# Patient Record
Sex: FEMALE | Race: WHITE | NOT HISPANIC OR LATINO | URBAN - METROPOLITAN AREA
[De-identification: names, ages, dates, MRNs, and addresses within clinical notes are randomized per-mention and may not be internally consistent; named-entity substitution may affect disease eponyms.]

---
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
## 2019-10-09 NOTE — ASU PREOP CHECKLIST - SELECT TESTS ORDERED
Instructions: This plan will send the code FBSE to the PM system.  DO NOT or CHANGE the price. Price (Do Not Change): 0.00 Detail Level: Generalized UCG/today

## 2021-08-10 ENCOUNTER — APPOINTMENT (OUTPATIENT)
Dept: FAMILY MEDICINE | Facility: CLINIC | Age: 21
End: 2021-08-10
Payer: COMMERCIAL

## 2021-08-10 VITALS
OXYGEN SATURATION: 98 % | HEIGHT: 64 IN | TEMPERATURE: 96.6 F | WEIGHT: 110 LBS | BODY MASS INDEX: 18.78 KG/M2 | SYSTOLIC BLOOD PRESSURE: 112 MMHG | DIASTOLIC BLOOD PRESSURE: 80 MMHG | HEART RATE: 83 BPM

## 2021-08-10 VITALS
DIASTOLIC BLOOD PRESSURE: 80 MMHG | OXYGEN SATURATION: 98 % | TEMPERATURE: 96.6 F | WEIGHT: 110 LBS | HEIGHT: 64 IN | BODY MASS INDEX: 18.78 KG/M2 | HEART RATE: 83 BPM | SYSTOLIC BLOOD PRESSURE: 112 MMHG

## 2021-08-10 DIAGNOSIS — H92.09 OTALGIA, UNSPECIFIED EAR: ICD-10-CM

## 2021-08-10 DIAGNOSIS — Z78.9 OTHER SPECIFIED HEALTH STATUS: ICD-10-CM

## 2021-08-10 PROBLEM — F41.9 ANXIETY DISORDER, UNSPECIFIED: Chronic | Status: ACTIVE | Noted: 2017-06-26

## 2021-08-10 PROBLEM — J35.8 OTHER CHRONIC DISEASES OF TONSILS AND ADENOIDS: Chronic | Status: ACTIVE | Noted: 2017-06-27

## 2021-08-10 PROBLEM — F90.9 ATTENTION-DEFICIT HYPERACTIVITY DISORDER, UNSPECIFIED TYPE: Chronic | Status: ACTIVE | Noted: 2017-06-26

## 2021-08-10 PROCEDURE — 99213 OFFICE O/P EST LOW 20 MIN: CPT

## 2021-08-10 RX ORDER — METHYLPHENIDATE HYDROCHLORIDE 36 MG/1
36 TABLET, EXTENDED RELEASE ORAL DAILY
Refills: 0 | Status: ACTIVE | COMMUNITY
Start: 2021-08-10

## 2021-08-10 NOTE — PHYSICAL EXAM
[Normal] : affect was normal and insight and judgment were intact [de-identified] : EAC clear b/l- TM effusion b/l without any TM bulging.  nares edematous b/l, PND

## 2021-08-10 NOTE — PLAN
[FreeTextEntry1] : Patient is advised to use the Flonase as directed, and to avoid allergens if possible.  She  is also advised to use an oral antihistamine if the Flonase alone is not effective.  She is also advised to wash her hair at night, to keep bedroom windows closed, to keep pets off of upholstery and to use HEPA filters in common areas and in the bedroom. She may also use hypoallergenic pillowcases as well, as this may also help mitigate some of her symptoms.  If there is no improvement with the above, she is advised to follow up with the office for further med changes, and if needed, referral to ENT or an allergist.  She will also use the acetic acid as needed for her ear.  SHe has no sign of infection, and no antibiotics are currently recommended. \par

## 2021-08-10 NOTE — HISTORY OF PRESENT ILLNESS
[FreeTextEntry8] : Patient complains of right ear pain x 3 days.  She denies any congestion or URI symptoms.  SHe has not been swimming, but admits that she put warm water in her ear trying to address it.

## 2021-09-01 ENCOUNTER — RX CHANGE (OUTPATIENT)
Age: 21
End: 2021-09-01

## 2021-12-22 ENCOUNTER — APPOINTMENT (OUTPATIENT)
Dept: FAMILY MEDICINE | Facility: CLINIC | Age: 21
End: 2021-12-22
Payer: COMMERCIAL

## 2021-12-22 VITALS
TEMPERATURE: 98.7 F | OXYGEN SATURATION: 99 % | HEIGHT: 64 IN | DIASTOLIC BLOOD PRESSURE: 60 MMHG | WEIGHT: 107 LBS | SYSTOLIC BLOOD PRESSURE: 108 MMHG | HEART RATE: 73 BPM | BODY MASS INDEX: 18.27 KG/M2

## 2021-12-22 DIAGNOSIS — Z83.3 FAMILY HISTORY OF DIABETES MELLITUS: ICD-10-CM

## 2021-12-22 PROCEDURE — G0444 DEPRESSION SCREEN ANNUAL: CPT | Mod: 59

## 2021-12-22 PROCEDURE — 99395 PREV VISIT EST AGE 18-39: CPT

## 2021-12-22 RX ORDER — FLUOXETINE HYDROCHLORIDE 20 MG/1
20 TABLET ORAL
Refills: 0 | Status: DISCONTINUED | COMMUNITY
Start: 2021-08-10 | End: 2021-12-22

## 2021-12-22 RX ORDER — NORETHINDRONE ACETATE AND ETHINYL ESTRADIOL, ETHINYL ESTRADIOL AND FERROUS FUMARATE 1MG-10(24)
KIT ORAL
Refills: 0 | Status: ACTIVE | COMMUNITY

## 2021-12-22 NOTE — PLAN
[FreeTextEntry1] : Reviewed age-appropriate preventive screening tests with patient. UTD on gyn exam. Offered/revd STI labs and she defers on these today as not needed. \par \par Labs 2020 showed mildly low HDL 42 and mildly elevated  and we revd  eating and she has already made some healthy changes. Disc option to check labs again this year vs. ok to defer for a year or two if she prefers. She opts to defer. She will cont to work on eating cleanly. \par \par She had flu vacc this season and has appt today for COVID vacc booster dose \par \par Discussed clean eating (eg Mediterranean style eating plan) and regular exercise/staying as physically active as possible.\par \par Cont ADD meds and f/u as per psychiatrist. \par \par Reviewed importance of good self care (eg meditation, yoga, adequate rest, regular exercise, magnesium, clean eating etc).\par \par Next CPE in 1-4 yrs.

## 2021-12-22 NOTE — REVIEW OF SYSTEMS
[Anxiety] : anxiety [Negative] : Heme/Lymph [Headache] : no headache [Dizziness] : no dizziness [Fainting] : no fainting [Suicidal] : not suicidal [Insomnia] : no insomnia [Depression] : no depression [de-identified] : +ADD, sxs well controlled on current med regimen [de-identified] : anxiety sxs well controlled with good self care

## 2021-12-22 NOTE — HISTORY OF PRESENT ILLNESS
[de-identified] : Her last PE was last year \par \par Her last tetanus shot was 8/2020 Tdap\par Believes she had HPV series and will check with mom.\par \par Her last dentist visit was within past 6 months\par Her last eye doctor appointment was within past year or two\par \par Her diet is clean/healthful overall when home from college-- a little less so when at college but easier to eat healthfully this year as she has a kitchen so is doing better than last year \par Exercises regularly-- walks daily \par \par Her last gyn exam was in Summer 2021 Dr. Romanacci; is on OCPs. Not SA now but has been in past. Does not need STI labs at this time\par \par Cristel also has h/o ADD and anxiety. She is followed by a psychiatrist Dr. Pratt for these meds. She is on Concerta but no longer on fluoxetine (off this for year). Is tolerating med well and is effective for her. Does good self care regularly. \par \par Is entering final semester at [employer] major with focus on ME and will be looking for job afterwards

## 2021-12-22 NOTE — PHYSICAL EXAM
[No Acute Distress] : no acute distress [Well Nourished] : well nourished [Well Developed] : well developed [Well-Appearing] : well-appearing [Normal Sclera/Conjunctiva] : normal sclera/conjunctiva [EOMI] : extraocular movements intact [Normal Outer Ear/Nose] : the outer ears and nose were normal in appearance [No JVD] : no jugular venous distention [No Lymphadenopathy] : no lymphadenopathy [Thyroid Normal, No Nodules] : the thyroid was normal and there were no nodules present [Supple] : supple [No Respiratory Distress] : no respiratory distress  [No Accessory Muscle Use] : no accessory muscle use [Clear to Auscultation] : lungs were clear to auscultation bilaterally [Normal Rate] : normal rate  [Regular Rhythm] : with a regular rhythm [Normal S1, S2] : normal S1 and S2 [No Murmur] : no murmur heard [No Carotid Bruits] : no carotid bruits [No Varicosities] : no varicosities [Pedal Pulses Present] : the pedal pulses are present [No Edema] : there was no peripheral edema [No Extremity Clubbing/Cyanosis] : no extremity clubbing/cyanosis [Soft] : abdomen soft [Non Tender] : non-tender [Non-distended] : non-distended [No Masses] : no abdominal mass palpated [No HSM] : no HSM [Normal Bowel Sounds] : normal bowel sounds [Normal Posterior Cervical Nodes] : no posterior cervical lymphadenopathy [Normal Anterior Cervical Nodes] : no anterior cervical lymphadenopathy [No Joint Swelling] : no joint swelling [Grossly Normal Strength/Tone] : grossly normal strength/tone [No Rash] : no rash [Coordination Grossly Intact] : coordination grossly intact [No Focal Deficits] : no focal deficits [Normal Gait] : normal gait [Normal Affect] : the affect was normal [Normal Insight/Judgement] : insight and judgment were intact [de-identified] : slender frame

## 2021-12-22 NOTE — HEALTH RISK ASSESSMENT
[0] : 2) Feeling down, depressed, or hopeless: Not at all (0) [PHQ-2 Negative - No further assessment needed] : PHQ-2 Negative - No further assessment needed [[id number]] : 0

## 2022-12-28 ENCOUNTER — APPOINTMENT (OUTPATIENT)
Dept: FAMILY MEDICINE | Facility: CLINIC | Age: 22
End: 2022-12-28

## 2022-12-28 VITALS
HEIGHT: 64 IN | OXYGEN SATURATION: 98 % | TEMPERATURE: 98.7 F | DIASTOLIC BLOOD PRESSURE: 62 MMHG | SYSTOLIC BLOOD PRESSURE: 102 MMHG | HEART RATE: 107 BPM | BODY MASS INDEX: 17.75 KG/M2 | WEIGHT: 104 LBS

## 2022-12-28 DIAGNOSIS — J01.00 ACUTE MAXILLARY SINUSITIS, UNSPECIFIED: ICD-10-CM

## 2022-12-28 PROCEDURE — 99395 PREV VISIT EST AGE 18-39: CPT

## 2022-12-28 RX ORDER — FLUTICASONE PROPIONATE 50 UG/1
50 SPRAY, METERED NASAL
Qty: 16 | Refills: 3 | Status: DISCONTINUED | COMMUNITY
Start: 2021-08-10 | End: 2022-12-28

## 2022-12-28 RX ORDER — ACETIC ACID 20 MG/ML
2 SOLUTION AURICULAR (OTIC) DAILY
Qty: 1 | Refills: 0 | Status: DISCONTINUED | COMMUNITY
Start: 2021-08-10 | End: 2022-12-28

## 2022-12-28 NOTE — PHYSICAL EXAM
[No Acute Distress] : no acute distress [Well Nourished] : well nourished [Well Developed] : well developed [Well-Appearing] : well-appearing [Normal Sclera/Conjunctiva] : normal sclera/conjunctiva [EOMI] : extraocular movements intact [Normal Outer Ear/Nose] : the outer ears and nose were normal in appearance [No JVD] : no jugular venous distention [No Lymphadenopathy] : no lymphadenopathy [Supple] : supple [Thyroid Normal, No Nodules] : the thyroid was normal and there were no nodules present [No Respiratory Distress] : no respiratory distress  [No Accessory Muscle Use] : no accessory muscle use [Clear to Auscultation] : lungs were clear to auscultation bilaterally [Normal Rate] : normal rate  [Regular Rhythm] : with a regular rhythm [Normal S1, S2] : normal S1 and S2 [No Murmur] : no murmur heard [No Carotid Bruits] : no carotid bruits [No Varicosities] : no varicosities [Pedal Pulses Present] : the pedal pulses are present [No Edema] : there was no peripheral edema [No Extremity Clubbing/Cyanosis] : no extremity clubbing/cyanosis [Soft] : abdomen soft [Non Tender] : non-tender [Non-distended] : non-distended [No Masses] : no abdominal mass palpated [No HSM] : no HSM [Normal Bowel Sounds] : normal bowel sounds [Normal Posterior Cervical Nodes] : no posterior cervical lymphadenopathy [Normal Anterior Cervical Nodes] : no anterior cervical lymphadenopathy [No Joint Swelling] : no joint swelling [Grossly Normal Strength/Tone] : grossly normal strength/tone [No Rash] : no rash [Coordination Grossly Intact] : coordination grossly intact [No Focal Deficits] : no focal deficits [Normal Gait] : normal gait [Normal Affect] : the affect was normal [Normal Insight/Judgement] : insight and judgment were intact [Normal Oropharynx] : the oropharynx was normal [Normal TMs] : both tympanic membranes were normal [de-identified] : slender frame, +audible nasal congestion, occas cough, no resp distress, nontoxic appearance [de-identified] : +TTP over B frontal/maxill sinuses; nasal mucosa erythematous and edematous [de-identified] : lungs CTA B with regular breathing and with cough, no w/c/r, good AE

## 2022-12-28 NOTE — REVIEW OF SYSTEMS
[Anxiety] : anxiety [Negative] : Heme/Lymph [Fever] : no fever [Chills] : no chills [Fatigue] : fatigue [Recent Change In Weight] : ~T no recent weight change [Earache] : earache [Nasal Discharge] : nasal discharge [Sore Throat] : no sore throat [Postnasal Drip] : postnasal drip [Headache] : no headache [Dizziness] : no dizziness [Fainting] : no fainting [Suicidal] : not suicidal [Insomnia] : no insomnia [Depression] : no depression [FreeTextEntry2] : +fatigue with acute illness sxs [FreeTextEntry4] : +sinus pressure/pain [de-identified] : +ADD, sxs well controlled on current med regimen [de-identified] : anxiety sxs well controlled with good self care

## 2022-12-28 NOTE — PLAN
[FreeTextEntry1] : Continue all medications as prescribed. Check labs as above; STI labs offered with labwork today and she defers on these as states is not needed at this time; she defers on labs today due to acutr resp infection but will RTO in near future for fasting labs. Will adjust any medications based upon lab results.\par \par Reviewed age-appropriate preventive screening tests with patient. She is UTD on gyn exam. \par \par Immunizations are UTD per Cristel, including she believes she had HPV series. I asked her to try to get HPV vacc dates for me to review/confirm she is UTD. \par \par Discussed clean eating (eg Mediterranean style eating plan) and regular exercise/staying as physically active as possible.  Include balance exercises and strength training and core strengthening exercises for bone health and to decrease risk for falls. \par \par Reviewed importance of good self care (e.g. meditation, yoga, adequate rest, regular exercise, magnesium, clean eating, etc.). \par \par Cont ADD meds and f/u as per psychiatrist.\par \par Reviewed likely diagnosis of viral URI/early sinus infection and revd usual course for sxs, supportive care measures including fluids, rest, vit C, black elderberry, umcka, zinc, OTC antihistamines (as needed to dry up post nasal drip/discharge etc but avoid these if sxs are localized to sinuses as can make it harder for mucous to drain from sinuses), OTC decongestants if tolerated (to help with congestion/sinus/facial pressure), Tylenol/ibuprofen prn pain, Mucinex (to thin mucous), nasal saline flushes/spray, nasal steroid spray (eg Fluticasone) +/- nasal decongestant spray (eg Afrin) if desired but if using this must limit use to <=3-5 days to avoid rebound nasal/sinus congestion/rhinitis medicamentosa etc. \par \par Reviewed indications for antibiotic use for sinus infections incl symptoms x >7-10 days without significant improvement, severe facial pain, persistent fevers, double worsening pattern of sxs etc. If needed can fill and take antibiotics and if needs to start these must complete entire course and should consider probiotic use while on antibiotics. She has used Doxycycline in the past (for acne) with no signif SE so I sent Rx for  this for her to fill and take over upcoming holiday weekend if incr/new sxs or if sxs not improving with incr supp care measures in next several days\par \par RTO if incr/new sxs or if sxs not improving with above measures over the next week.\par \par Follow up for next physical in 1-4 years.\par \par The following OTC medications are recommended to treat URI/cold symptoms. Patient may use any or all of the following, as clinically indicated by symptoms, as long as not contraindicated by allergy or other medical conditions.\par \par Neti-pot/nasal saline spray- ¼ tsp salt dissolved in warm water in Neti Pot\par Umcka (pelargonium)- treats cough/cold symptoms\par Sambucol/Sambucus (black elderberry syrup)- boosts immune system\par Nasal Steroid Spray (e.g. Fluticasone)- decreases nasal/sinus congestion; okay to use for weeks or months at a time\par Nasal decongestant spray (e.g. Afrin)- decreases nasal/sinus congestion but can only use short term (ie a few days at most) or else have side effects\par Decongestant (e.g. Sudafed)- decreases nasal/sinus congestion\par Antihistamine (e.g. Benadryl, Claritin etc.)- decreases runny nose and post nasal drip- NOT for use during acute sinus infections\par Mucolytic (e.g. Mucinex)- thins mucous to help it drain more easily\par Tylenol- for fever, pain, headache, aches etc.\par Ibuprofen (e.g. Advil, Motrin) or Naproxen (e.g. Aleve)- for fever, pain, headache, aches\par Fluids- thins mucous, keeps you hydrated\par Zinc- reduces duration and severity of cold symptoms\par Honey- 1 tsp. to 1 Tbsp. straight off spoon or mixed with tea or hot water- soothes sore throat and quiets cough\par \par

## 2022-12-28 NOTE — HISTORY OF PRESENT ILLNESS
[de-identified] : Her last physical exam was last year\par \par Vaccines:\par Tetanus is up to date; last Tdap 8/17/2020\par COVID vaccine is up to date\par Believes she had HPV series and will try to get dates\par \par Her last dentist visit was less than one year ago\par Her last eye doctor appointment was less than one year ago\par Her last dermatologist visit was within the past two years or three\par \par GYN visit is up to date; 12/2022, is on OCPs. Not SA now but has been in past. Does not need STI labs at this time.\par \par Her diet is healthy overall \par Exercise: regularly - walks daily\par \par Cristel also has h/o ADD and anxiety. She is followed by a psychiatrist Dr. Pratt for these meds. She is on Concerta 72 mg daily and if skips meds gets withdrawal SEs but can take 36 mg lower dose on some days. Is tolerating med well and is effective for her. Does good self care regularly. \par \par Has sinus infection sxs for past 5 days. Lost sense of taste and smell. +ear congestion and pressure, Sinus pressure/pain. +maxillary tooth pain, +nasal discharge. no fevers but she tends to not get these. Did two at home COVID tests and both were negative. Using OTC meds for sxs. Wants advice on what she can do for her sxs

## 2022-12-28 NOTE — ASSESSMENT
[FreeTextEntry1] : DONAVON PHILLIPS is a 22 year old female here for a physical exam.  She is also here to follow up on medical issues as noted above.\par \par Patient has a history of anxiety, attention deficit disorder, and hypercholesterolemia. She also has acute URI/viral head cold/sinus sxs (D#5). Revd that we can do viral swab if she would like to vs as she is healthy and it would not change mgmt in any way whether she has COVID or flu or other viral infection I am comfortable deferring on viral swab and just managing sxs expectantly. She defers on viral swab test at this time What Is The Reason For Today's Visit?: History of Melanoma Breslow Depth?: 0.20 Year Excised?: 2019

## 2022-12-28 NOTE — HEALTH RISK ASSESSMENT
[0] : 2) Feeling down, depressed, or hopeless: Not at all (0) [PHQ-2 Negative - No further assessment needed] : PHQ-2 Negative - No further assessment needed [[id number]] : 0

## 2023-01-11 ENCOUNTER — APPOINTMENT (OUTPATIENT)
Dept: FAMILY MEDICINE | Facility: CLINIC | Age: 23
End: 2023-01-11
Payer: COMMERCIAL

## 2023-01-11 PROCEDURE — 36415 COLL VENOUS BLD VENIPUNCTURE: CPT

## 2023-01-14 LAB
ALBUMIN SERPL ELPH-MCNC: 4.6 G/DL
ALP BLD-CCNC: 78 U/L
ALT SERPL-CCNC: 12 U/L
ANION GAP SERPL CALC-SCNC: 11 MMOL/L
AST SERPL-CCNC: 13 U/L
BASOPHILS # BLD AUTO: 0.04 K/UL
BASOPHILS NFR BLD AUTO: 0.6 %
BILIRUB SERPL-MCNC: 0.5 MG/DL
BUN SERPL-MCNC: 11 MG/DL
CALCIUM SERPL-MCNC: 10.2 MG/DL
CHLORIDE SERPL-SCNC: 103 MMOL/L
CHOLEST SERPL-MCNC: 161 MG/DL
CO2 SERPL-SCNC: 24 MMOL/L
CREAT SERPL-MCNC: 0.7 MG/DL
EGFR: 124 ML/MIN/1.73M2
EOSINOPHIL # BLD AUTO: 0.04 K/UL
EOSINOPHIL NFR BLD AUTO: 0.6 %
GLUCOSE SERPL-MCNC: 88 MG/DL
HCT VFR BLD CALC: 43.2 %
HDLC SERPL-MCNC: 40 MG/DL
HGB BLD-MCNC: 14 G/DL
IMM GRANULOCYTES NFR BLD AUTO: 0.1 %
LDLC SERPL CALC-MCNC: 86 MG/DL
LYMPHOCYTES # BLD AUTO: 2.01 K/UL
LYMPHOCYTES NFR BLD AUTO: 29.8 %
MAN DIFF?: NORMAL
MCHC RBC-ENTMCNC: 30.8 PG
MCHC RBC-ENTMCNC: 32.4 GM/DL
MCV RBC AUTO: 94.9 FL
MONOCYTES # BLD AUTO: 0.5 K/UL
MONOCYTES NFR BLD AUTO: 7.4 %
NEUTROPHILS # BLD AUTO: 4.15 K/UL
NEUTROPHILS NFR BLD AUTO: 61.5 %
NONHDLC SERPL-MCNC: 121 MG/DL
PLATELET # BLD AUTO: 320 K/UL
POTASSIUM SERPL-SCNC: 4.8 MMOL/L
PROT SERPL-MCNC: 7 G/DL
RBC # BLD: 4.55 M/UL
RBC # FLD: 12.8 %
SODIUM SERPL-SCNC: 139 MMOL/L
TRIGL SERPL-MCNC: 176 MG/DL
TSH SERPL-ACNC: 2.84 UIU/ML
WBC # FLD AUTO: 6.75 K/UL

## 2023-09-24 ENCOUNTER — NON-APPOINTMENT (OUTPATIENT)
Age: 23
End: 2023-09-24

## 2023-09-26 ENCOUNTER — APPOINTMENT (OUTPATIENT)
Dept: FAMILY MEDICINE | Facility: CLINIC | Age: 23
End: 2023-09-26
Payer: COMMERCIAL

## 2023-09-26 ENCOUNTER — RESULT CHARGE (OUTPATIENT)
Age: 23
End: 2023-09-26

## 2023-09-26 VITALS
WEIGHT: 104 LBS | TEMPERATURE: 99.4 F | HEART RATE: 85 BPM | BODY MASS INDEX: 17.75 KG/M2 | SYSTOLIC BLOOD PRESSURE: 92 MMHG | DIASTOLIC BLOOD PRESSURE: 62 MMHG | HEIGHT: 64 IN | OXYGEN SATURATION: 98 %

## 2023-09-26 DIAGNOSIS — J06.9 ACUTE UPPER RESPIRATORY INFECTION, UNSPECIFIED: ICD-10-CM

## 2023-09-26 DIAGNOSIS — R68.89 OTHER GENERAL SYMPTOMS AND SIGNS: ICD-10-CM

## 2023-09-26 LAB
FLUAV SPEC QL CULT: NEGATIVE
FLUBV AG SPEC QL IA: NEGATIVE

## 2023-09-26 PROCEDURE — 87804 INFLUENZA ASSAY W/OPTIC: CPT | Mod: QW

## 2023-09-26 PROCEDURE — 99213 OFFICE O/P EST LOW 20 MIN: CPT | Mod: 25

## 2023-09-26 RX ORDER — DOXYCYCLINE HYCLATE 100 MG/1
100 CAPSULE ORAL
Qty: 20 | Refills: 0 | Status: DISCONTINUED | COMMUNITY
Start: 2022-12-28 | End: 2023-09-26

## 2023-09-27 ENCOUNTER — NON-APPOINTMENT (OUTPATIENT)
Age: 23
End: 2023-09-27

## 2023-12-08 ENCOUNTER — NON-APPOINTMENT (OUTPATIENT)
Age: 23
End: 2023-12-08

## 2023-12-08 DIAGNOSIS — R19.5 OTHER FECAL ABNORMALITIES: ICD-10-CM

## 2023-12-17 LAB — BACTERIA STL CULT: NORMAL

## 2023-12-18 LAB — DEPRECATED O AND P PREP STL: NORMAL

## 2024-01-10 ENCOUNTER — RESULT CHARGE (OUTPATIENT)
Age: 24
End: 2024-01-10

## 2024-01-17 ENCOUNTER — APPOINTMENT (OUTPATIENT)
Dept: FAMILY MEDICINE | Facility: CLINIC | Age: 24
End: 2024-01-17
Payer: COMMERCIAL

## 2024-01-17 ENCOUNTER — NON-APPOINTMENT (OUTPATIENT)
Age: 24
End: 2024-01-17

## 2024-01-17 DIAGNOSIS — G89.29 LOW BACK PAIN, UNSPECIFIED: ICD-10-CM

## 2024-01-17 DIAGNOSIS — R53.82 CHRONIC FATIGUE, UNSPECIFIED: ICD-10-CM

## 2024-01-17 DIAGNOSIS — M54.50 LOW BACK PAIN, UNSPECIFIED: ICD-10-CM

## 2024-01-17 DIAGNOSIS — E78.00 PURE HYPERCHOLESTEROLEMIA, UNSPECIFIED: ICD-10-CM

## 2024-01-17 DIAGNOSIS — Z00.00 ENCOUNTER FOR GENERAL ADULT MEDICAL EXAMINATION W/OUT ABNORMAL FINDINGS: ICD-10-CM

## 2024-01-17 DIAGNOSIS — F41.9 ANXIETY DISORDER, UNSPECIFIED: ICD-10-CM

## 2024-01-17 DIAGNOSIS — F98.8 OTHER SPECIFIED BEHAVIORAL AND EMOTIONAL DISORDERS WITH ONSET USUALLY OCCURRING IN CHILDHOOD AND ADOLESCENCE: ICD-10-CM

## 2024-01-17 PROCEDURE — 93000 ELECTROCARDIOGRAM COMPLETE: CPT | Mod: 59

## 2024-01-17 PROCEDURE — 99213 OFFICE O/P EST LOW 20 MIN: CPT | Mod: 25

## 2024-01-17 PROCEDURE — 99395 PREV VISIT EST AGE 18-39: CPT | Mod: 25

## 2024-01-17 PROCEDURE — 36415 COLL VENOUS BLD VENIPUNCTURE: CPT

## 2024-01-17 NOTE — PLAN
[FreeTextEntry1] : Continue all medications as prescribed. Check labs as above. Will adjust any medications based upon lab results.  Reviewed age-appropriate preventive screening tests with patient. She is due for gyn exam and will schedule for near future.  Immunizations are UTD per Cristel, including she believes she had HPV series. I asked her to try to get HPV vacc dates for me to review/confirm she is UTD.   ECG done today as she is on ADD meds. ECG NSR. nl ECG  LDL goal <=100 ideally. No indication for meds at this stage of her life. She should focus on clean eating and regular exercise +/- can consider psyllium fiber supplement to try to improve lipids.   Discussed clean eating (eg Mediterranean style plant based eating plan) and regular exercise/staying as physically active as possible.  Include balance exercises and strength training and core strengthening exercises for bone health and to decrease risk for falls.   Reviewed importance of good self care (e.g. meditation, yoga, adequate rest, regular exercise, magnesium, clean eating, etc.).   Cont ADD meds and f/u as per psychiatrist.  Follow up for next physical in 1-4 years.  Additional time spent addressing new or existing problems, requiring additional work outside of the normal scope of a routine annual exam: 20 minutes.

## 2024-01-17 NOTE — HISTORY OF PRESENT ILLNESS
[FreeTextEntry1] : DONAVON PHILLIPS is a 23 year old female here for a physical exam. [de-identified] : Her last physical exam was last year  Vaccines: Tetanus is up to date,Tdap 8/17/2020 COVID vaccine is up to date Believes she had HPV series   Her last dentist visit was less than one year ago Her last eye doctor appointment was less than one year ago Her last dermatologist visit was within the past two years or three  GYN visit is up to date Later 2023, is on OCPs. Not SA now but has been in past. Does not need STI labs at this time.  Her diet is healthy overall  Exercise: regularly - walks daily  Cristle also has ADD and anxiety, and mild high cholesterol.   She is followed by a psychiatrist Dr. Cochran for these meds. She is on Concerta 72 mg daily and if skips meds gets withdrawal SEs but can take 36 mg lower dose on some days. Is tolerating med well and is effective for her. Does good self care regularly.  Occas takes 1/2 pill but can not skip doses of ADD meds as needs every day to function well/ Tolerates med well.  1/2023 labs showed , HDL 40. She is trying to eat cleanly and keep physically active to improve lipids.   Has low back discomfort regularly . Worse when sitting for a long time.. feels that pelvis tilts excessively resulting in lower back hyperextension. Is willing to try yoga  Also concerned as is feeling tired all the time. Stress levels at usual no signif change. Is in bed 7-8 hrs per night. Exercising regularly. Not sure if snores. Feels not refreshed and groggy in AM even if had 7-8 hrs. Not aware of any dreams so wonders if maybe not getting REM sleep. Ludivina occas social ETOH in moderation. Takes ADD med early in day as much as possible so that it will not interfere with sleep . Friend was just dx with narcolepsy

## 2024-01-17 NOTE — HEALTH RISK ASSESSMENT
[0] : 2) Feeling down, depressed, or hopeless: Not at all (0) [PHQ-2 Negative - No further assessment needed] : PHQ-2 Negative - No further assessment needed [Never] : Never [[id number]] : 0

## 2024-01-17 NOTE — ASSESSMENT
[FreeTextEntry1] : DONAVON PHILLIPS is a 23 year old female here for a physical exam.  She is also here to follow up on medical issues as noted above.  Disc doing labs to eval for etiology of chronic fatigue fatigue and also checking sleep study . She would be interested in doing a sleep study so was ordered. She will work on clean eating (cut back on sweets), regular exercise, yoga, stress mgmt, etc.   Low back discomfort related to pelvic tilt. Offered XR L/S Spine and pelvis, ortho referral, trial of PT, trial of exercise on her own (eg yoga etc). She will start with exercise/yoga and if not improving will do XR and we will use that info to decide if needs to consider ortho and/or Pt

## 2024-01-17 NOTE — PHYSICAL EXAM
[No Acute Distress] : no acute distress [Well Nourished] : well nourished [Well Developed] : well developed [Well-Appearing] : well-appearing [Normal Sclera/Conjunctiva] : normal sclera/conjunctiva [EOMI] : extraocular movements intact [Normal Outer Ear/Nose] : the outer ears and nose were normal in appearance [Normal Oropharynx] : the oropharynx was normal [Normal TMs] : both tympanic membranes were normal [No JVD] : no jugular venous distention [No Lymphadenopathy] : no lymphadenopathy [Supple] : supple [Thyroid Normal, No Nodules] : the thyroid was normal and there were no nodules present [No Respiratory Distress] : no respiratory distress  [No Accessory Muscle Use] : no accessory muscle use [Clear to Auscultation] : lungs were clear to auscultation bilaterally [Normal Rate] : normal rate  [Regular Rhythm] : with a regular rhythm [Normal S1, S2] : normal S1 and S2 [No Murmur] : no murmur heard [No Carotid Bruits] : no carotid bruits [No Varicosities] : no varicosities [Pedal Pulses Present] : the pedal pulses are present [No Edema] : there was no peripheral edema [No Extremity Clubbing/Cyanosis] : no extremity clubbing/cyanosis [Soft] : abdomen soft [Non Tender] : non-tender [Non-distended] : non-distended [No Masses] : no abdominal mass palpated [No HSM] : no HSM [Normal Bowel Sounds] : normal bowel sounds [No Joint Swelling] : no joint swelling [Grossly Normal Strength/Tone] : grossly normal strength/tone [No Rash] : no rash [Coordination Grossly Intact] : coordination grossly intact [No Focal Deficits] : no focal deficits [Normal Gait] : normal gait [Normal Affect] : the affect was normal [Normal Insight/Judgement] : insight and judgment were intact [Normal Posterior Cervical Nodes] : no posterior cervical lymphadenopathy [Normal Anterior Cervical Nodes] : no anterior cervical lymphadenopathy [No Spinal Tenderness] : no spinal tenderness [de-identified] : slender frame [de-identified] : mild hyperextension L spine due to pelvic tilt?

## 2024-01-17 NOTE — REVIEW OF SYSTEMS
[Fatigue] : fatigue [Anxiety] : anxiety [Negative] : ENT [Fever] : no fever [Chills] : no chills [Recent Change In Weight] : ~T no recent weight change [Headache] : no headache [Dizziness] : no dizziness [Fainting] : no fainting [Suicidal] : not suicidal [Insomnia] : no insomnia [Depression] : no depression [FreeTextEntry2] : +fatigue all the time [de-identified] : +ADD, sxs well controlled on current med regimen [de-identified] : anxiety sxs well controlled with good self care; Feels like sleep is not restful

## 2024-01-18 LAB
25(OH)D3 SERPL-MCNC: 28.1 NG/ML
ALBUMIN SERPL ELPH-MCNC: 4.3 G/DL
ALP BLD-CCNC: 61 U/L
ALT SERPL-CCNC: 14 U/L
ANION GAP SERPL CALC-SCNC: 9 MMOL/L
AST SERPL-CCNC: 13 U/L
BASOPHILS # BLD AUTO: 0.03 K/UL
BASOPHILS NFR BLD AUTO: 0.6 %
BILIRUB SERPL-MCNC: 0.7 MG/DL
BUN SERPL-MCNC: 7 MG/DL
CALCIUM SERPL-MCNC: 9.6 MG/DL
CHLORIDE SERPL-SCNC: 105 MMOL/L
CHOLEST SERPL-MCNC: 148 MG/DL
CO2 SERPL-SCNC: 25 MMOL/L
CREAT SERPL-MCNC: 0.79 MG/DL
EGFR: 106 ML/MIN/1.73M2
EOSINOPHIL # BLD AUTO: 0.03 K/UL
EOSINOPHIL NFR BLD AUTO: 0.6 %
FERRITIN SERPL-MCNC: 125 NG/ML
FOLATE SERPL-MCNC: >20 NG/ML
GLUCOSE SERPL-MCNC: 86 MG/DL
HCT VFR BLD CALC: 38.8 %
HDLC SERPL-MCNC: 41 MG/DL
HGB BLD-MCNC: 13.2 G/DL
IMM GRANULOCYTES NFR BLD AUTO: 0 %
IRON SATN MFR SERPL: 63 %
IRON SERPL-MCNC: 238 UG/DL
LDLC SERPL CALC-MCNC: 84 MG/DL
LYMPHOCYTES # BLD AUTO: 1.81 K/UL
LYMPHOCYTES NFR BLD AUTO: 37.6 %
MAGNESIUM SERPL-MCNC: 2.1 MG/DL
MAN DIFF?: NORMAL
MCHC RBC-ENTMCNC: 30.6 PG
MCHC RBC-ENTMCNC: 34 GM/DL
MCV RBC AUTO: 89.8 FL
MONOCYTES # BLD AUTO: 0.4 K/UL
MONOCYTES NFR BLD AUTO: 8.3 %
NEUTROPHILS # BLD AUTO: 2.55 K/UL
NEUTROPHILS NFR BLD AUTO: 52.9 %
NONHDLC SERPL-MCNC: 108 MG/DL
PLATELET # BLD AUTO: 266 K/UL
POTASSIUM SERPL-SCNC: 5.2 MMOL/L
PROT SERPL-MCNC: 6.5 G/DL
RBC # BLD: 4.32 M/UL
RBC # FLD: 12.5 %
SODIUM SERPL-SCNC: 138 MMOL/L
TIBC SERPL-MCNC: 375 UG/DL
TRIGL SERPL-MCNC: 130 MG/DL
TSH SERPL-ACNC: 2.07 UIU/ML
UIBC SERPL-MCNC: 137 UG/DL
VIT B12 SERPL-MCNC: 369 PG/ML
WBC # FLD AUTO: 4.82 K/UL

## 2024-09-03 PROBLEM — R79.89 LOW VITAMIN B12 LEVEL: Status: ACTIVE | Noted: 2024-08-26

## 2024-09-03 PROBLEM — R79.0 SERUM IRON RAISED: Status: ACTIVE | Noted: 2024-08-26

## 2024-09-03 PROBLEM — E55.9 VITAMIN D INSUFFICIENCY: Status: ACTIVE | Noted: 2024-08-26

## 2024-09-04 ENCOUNTER — APPOINTMENT (OUTPATIENT)
Dept: FAMILY MEDICINE | Facility: CLINIC | Age: 24
End: 2024-09-04
Payer: COMMERCIAL

## 2024-09-04 VITALS
HEIGHT: 64 IN | BODY MASS INDEX: 18.44 KG/M2 | WEIGHT: 108 LBS | DIASTOLIC BLOOD PRESSURE: 76 MMHG | OXYGEN SATURATION: 99 % | HEART RATE: 84 BPM | TEMPERATURE: 97.3 F | SYSTOLIC BLOOD PRESSURE: 118 MMHG

## 2024-09-04 DIAGNOSIS — E78.00 PURE HYPERCHOLESTEROLEMIA, UNSPECIFIED: ICD-10-CM

## 2024-09-04 DIAGNOSIS — R53.82 CHRONIC FATIGUE, UNSPECIFIED: ICD-10-CM

## 2024-09-04 DIAGNOSIS — R79.89 OTHER SPECIFIED ABNORMAL FINDINGS OF BLOOD CHEMISTRY: ICD-10-CM

## 2024-09-04 DIAGNOSIS — M25.50 PAIN IN UNSPECIFIED JOINT: ICD-10-CM

## 2024-09-04 DIAGNOSIS — F98.8 OTHER SPECIFIED BEHAVIORAL AND EMOTIONAL DISORDERS WITH ONSET USUALLY OCCURRING IN CHILDHOOD AND ADOLESCENCE: ICD-10-CM

## 2024-09-04 DIAGNOSIS — Z82.69 FAMILY HISTORY OF OTHER DISEASES OF THE MUSCULOSKELETAL SYSTEM AND CONNECTIVE TISSUE: ICD-10-CM

## 2024-09-04 DIAGNOSIS — M79.10 MYALGIA, UNSPECIFIED SITE: ICD-10-CM

## 2024-09-04 DIAGNOSIS — R79.0 ABNORMAL LVL OF BLOOD MINERAL: ICD-10-CM

## 2024-09-04 DIAGNOSIS — F41.9 ANXIETY DISORDER, UNSPECIFIED: ICD-10-CM

## 2024-09-04 DIAGNOSIS — E55.9 VITAMIN D DEFICIENCY, UNSPECIFIED: ICD-10-CM

## 2024-09-04 PROCEDURE — 36415 COLL VENOUS BLD VENIPUNCTURE: CPT

## 2024-09-04 PROCEDURE — 99215 OFFICE O/P EST HI 40 MIN: CPT

## 2024-09-04 PROCEDURE — G2211 COMPLEX E/M VISIT ADD ON: CPT | Mod: NC

## 2024-09-04 RX ORDER — NORETHINDRONE ACETATE AND ETHINYL ESTRADIOL, ETHINYL ESTRADIOL AND FERROUS FUMARATE 1MG-10(24)
1 MG-10 MCG / KIT ORAL
Refills: 0 | Status: ACTIVE | COMMUNITY

## 2024-09-04 NOTE — ASSESSMENT
[FreeTextEntry1] : CRISTEL PHILLIPS is a 23 year old female here for follow up on medical issues as noted above.  Cristel has ADD and anxiety, and mild high cholesterol. She was also noted to have mild vit D insufficiency (28), low normal/suboptimal vit B12 level, and elevated iron levels on her PE labs 1/2024. She has added b12 and vit D supplements and has cut out placebo pills in her OCP packs and we will repeat labs today.    Joint/muscle aches/pain with assoc signif fatigue-- ?fibromyalgia (there is FH same), ?EDS, ?Lyme, ?other rheum issue. We disc DDx to some extent and revd can start with labs and if wnl monitor for now vs  check labs and also refer her to rheum now. Revd that seeing rheumatologist for addtl eval/testing is reasonable given long duration of her sxs and FH fibromyalgia. She would like to try to get to the bottom of what is causing her sxs and would like to see rheum and start with labs today.   Check labs. TSH was wnl 1/2024 and sxs have been going on for years so can defer on that  Rheum eval. I gave [facility] Mcarthur's contact info; if poss she can sched with Dr. YZA Arroyo who has expertise in fibromyalgia  Briefly discussed option to try muscle relaxant to see if helps her sxs and she defers for now as would like to get rheum input first and sxs are not at this time severe enough that she feels she needs more than Tylenol

## 2024-09-04 NOTE — PHYSICAL EXAM
[No Acute Distress] : no acute distress [Well Nourished] : well nourished [Well Developed] : well developed [Well-Appearing] : well-appearing [Normal Sclera/Conjunctiva] : normal sclera/conjunctiva [EOMI] : extraocular movements intact [Normal Outer Ear/Nose] : the outer ears and nose were normal in appearance [Normal Oropharynx] : the oropharynx was normal [Normal TMs] : both tympanic membranes were normal [No JVD] : no jugular venous distention [No Lymphadenopathy] : no lymphadenopathy [Supple] : supple [Thyroid Normal, No Nodules] : the thyroid was normal and there were no nodules present [No Respiratory Distress] : no respiratory distress  [No Accessory Muscle Use] : no accessory muscle use [Clear to Auscultation] : lungs were clear to auscultation bilaterally [Normal Rate] : normal rate  [Regular Rhythm] : with a regular rhythm [Normal S1, S2] : normal S1 and S2 [No Murmur] : no murmur heard [No Carotid Bruits] : no carotid bruits [No Varicosities] : no varicosities [Pedal Pulses Present] : the pedal pulses are present [No Edema] : there was no peripheral edema [No Extremity Clubbing/Cyanosis] : no extremity clubbing/cyanosis [Soft] : abdomen soft [Non Tender] : non-tender [Non-distended] : non-distended [No Masses] : no abdominal mass palpated [No HSM] : no HSM [Normal Bowel Sounds] : normal bowel sounds [Normal Posterior Cervical Nodes] : no posterior cervical lymphadenopathy [Normal Anterior Cervical Nodes] : no anterior cervical lymphadenopathy [No Spinal Tenderness] : no spinal tenderness [No Joint Swelling] : no joint swelling [Grossly Normal Strength/Tone] : grossly normal strength/tone [No Rash] : no rash [Coordination Grossly Intact] : coordination grossly intact [No Focal Deficits] : no focal deficits [Normal Gait] : normal gait [Normal Affect] : the affect was normal [Normal Insight/Judgement] : insight and judgment were intact [de-identified] : slender frame [de-identified] : mild hyperextension L spine due to pelvic tilt? [de-identified] : no s/sxs active/acute synovitis, ++ flexible joints

## 2024-09-04 NOTE — REVIEW OF SYSTEMS
[Fatigue] : fatigue [Anxiety] : anxiety [Negative] : Heme/Lymph [Joint Pain] : joint pain [Joint Stiffness] : joint stiffness [Muscle Pain] : muscle pain [Back Pain] : back pain [Fever] : no fever [Chills] : no chills [Recent Change In Weight] : ~T no recent weight change [Joint Swelling] : no joint swelling [Muscle Weakness] : no muscle weakness [Headache] : no headache [Dizziness] : no dizziness [Fainting] : no fainting [Suicidal] : not suicidal [Insomnia] : no insomnia [Depression] : no depression [FreeTextEntry2] : +fatigued all the time [FreeTextEntry9] : diffuse (though lurdes of thighs, knees, wrists, upper back/shoulders) achy sore muscles and joints [de-identified] : +ADD, sxs well controlled on current med regimen [de-identified] : anxiety sxs well controlled with good self care; Feels like sleep is not restful

## 2024-09-04 NOTE — PHYSICAL EXAM
[No Acute Distress] : no acute distress [Well Nourished] : well nourished [Well Developed] : well developed [Well-Appearing] : well-appearing [Normal Sclera/Conjunctiva] : normal sclera/conjunctiva [EOMI] : extraocular movements intact [Normal Outer Ear/Nose] : the outer ears and nose were normal in appearance [Normal Oropharynx] : the oropharynx was normal [Normal TMs] : both tympanic membranes were normal [No JVD] : no jugular venous distention [No Lymphadenopathy] : no lymphadenopathy [Supple] : supple [Thyroid Normal, No Nodules] : the thyroid was normal and there were no nodules present [No Respiratory Distress] : no respiratory distress  [No Accessory Muscle Use] : no accessory muscle use [Clear to Auscultation] : lungs were clear to auscultation bilaterally [Normal Rate] : normal rate  [Regular Rhythm] : with a regular rhythm [Normal S1, S2] : normal S1 and S2 [No Murmur] : no murmur heard [No Carotid Bruits] : no carotid bruits [No Varicosities] : no varicosities [Pedal Pulses Present] : the pedal pulses are present [No Edema] : there was no peripheral edema [No Extremity Clubbing/Cyanosis] : no extremity clubbing/cyanosis [Soft] : abdomen soft [Non Tender] : non-tender [Non-distended] : non-distended [No Masses] : no abdominal mass palpated [No HSM] : no HSM [Normal Bowel Sounds] : normal bowel sounds [Normal Posterior Cervical Nodes] : no posterior cervical lymphadenopathy [Normal Anterior Cervical Nodes] : no anterior cervical lymphadenopathy [No Spinal Tenderness] : no spinal tenderness [No Joint Swelling] : no joint swelling [Grossly Normal Strength/Tone] : grossly normal strength/tone [No Rash] : no rash [Coordination Grossly Intact] : coordination grossly intact [No Focal Deficits] : no focal deficits [Normal Gait] : normal gait [Normal Affect] : the affect was normal [Normal Insight/Judgement] : insight and judgment were intact [de-identified] : slender frame [de-identified] : mild hyperextension L spine due to pelvic tilt? [de-identified] : no s/sxs active/acute synovitis, ++ flexible joints

## 2024-09-04 NOTE — HEALTH RISK ASSESSMENT
[0] : 2) Feeling down, depressed, or hopeless: Not at all (0) [PHQ-2 Negative - No further assessment needed] : PHQ-2 Negative - No further assessment needed [Never] : Never [[id number]] : 0

## 2024-09-04 NOTE — HISTORY OF PRESENT ILLNESS
[FreeTextEntry1] : DONAVON PHILLIPS is a 23 year old female here for a follow up visit. [de-identified] : Cristel has ADD and anxiety, and borderline high cholesterol (had low HDL 41 on 1/2024 labs). She was also noted to have mild vit D insufficiency (28), low normal/suboptimal vit B12 level, and elevated iron levels on her PE labs 1/2024.   She is followed by a psychiatrist Dr. Cochran for these meds. She is on Concerta 72 mg daily and if skips meds gets withdrawal SEs but can take 36 mg lower dose on some days. Is tolerating med well and is effective for her. Does good self care regularly.  Occas takes 1/2 pill but can not skip doses of ADD meds as needs every day to function well/ Tolerates med well.  At her visit 1/2024 vit D 28, B12 369, iron levels elevated (serum iron 238, % Fe sat 63%), nl H/H. Recommended she skip placebo pills in OCP packs, and start MVI daily/ She did not get to start MVI yet but will if levels are out of range again. Would like rpt CBC, iron studies, vit D, vit B12 today.   Is here today for eval of several months of pain; in fact has had sxs for years but seem more noticeable to her in past few months. Achy muscles and joints. Also associated with signif fatigue. Sxs are Worse in AM when first wakes up though does not seem to improve as day goes on. At end of day after sitting in chair for work is still in pain Used to exercise regularly and about 3 month sago she stopped exercise routine as thought might be contributing to her aches and pain but stopping exercise did not worse her sxs either.   Feels sore all over to some degree but lurdes thighs, knees, wrists, post neck/shoulders. +bruises easily. +Hypermobile joints lurdes wrists. +fatigued freq and for a long time. Has always had odd aches and pains even in childhood, often dismissed as growing pains, though now wonders if was something more.   No fevers. no rashes, No red hot swollen joints. No h/o known tick bites.   Cristel gets 7-8 hrs sleep per night. Eating cleanly overall. Does have some added sugars/WF carbs/junk food though tries to limit.. Gets 40+ oz water per day she thinks.   Currently exercise regimen is mainly walking as strength training is on hold . She tries to stretch daily.   Has FH fibromyalgia (pat aunt, and MGM) . No other FH autoimmune issue incl in M/F.  She has not had rheum eval or testing ever.   Tylenol helps pain . Feels like she helped someone move their home the day before or like she was walking on a treadmill all night in her sleep based on how sore she feels in pain. Overall she can manage with sxs but wanted to come in for eval to get a better answer as to poss cause of sxs and to make a treatment plan to hopefully keep sxs from worsening as she gets older  UTD on gyn exams as had one in 8/2024 with Dr. Gu and all wnl. Told her gyn she was skipping placebo pills as I had asked her to do so as I thought they contained iron but she states Dr UNGER said she only has 2 placebo pills per pack (not clear whether they contain iron or not--she will bring pill pack to next visit).

## 2024-09-04 NOTE — HISTORY OF PRESENT ILLNESS
[FreeTextEntry1] : DONAVON PHILLIPS is a 23 year old female here for a follow up visit. [de-identified] : Cristel has ADD and anxiety, and borderline high cholesterol (had low HDL 41 on 1/2024 labs). She was also noted to have mild vit D insufficiency (28), low normal/suboptimal vit B12 level, and elevated iron levels on her PE labs 1/2024.   She is followed by a psychiatrist Dr. Cochran for these meds. She is on Concerta 72 mg daily and if skips meds gets withdrawal SEs but can take 36 mg lower dose on some days. Is tolerating med well and is effective for her. Does good self care regularly.  Occas takes 1/2 pill but can not skip doses of ADD meds as needs every day to function well/ Tolerates med well.  At her visit 1/2024 vit D 28, B12 369, iron levels elevated (serum iron 238, % Fe sat 63%), nl H/H. Recommended she skip placebo pills in OCP packs, and start MVI daily/ She did not get to start MVI yet but will if levels are out of range again. Would like rpt CBC, iron studies, vit D, vit B12 today.   Is here today for eval of several months of pain; in fact has had sxs for years but seem more noticeable to her in past few months. Achy muscles and joints. Also associated with signif fatigue. Sxs are Worse in AM when first wakes up though does not seem to improve as day goes on. At end of day after sitting in chair for work is still in pain Used to exercise regularly and about 3 month sago she stopped exercise routine as thought might be contributing to her aches and pain but stopping exercise did not worse her sxs either.   Feels sore all over to some degree but lurdes thighs, knees, wrists, post neck/shoulders. +bruises easily. +Hypermobile joints lurdes wrists. +fatigued freq and for a long time. Has always had odd aches and pains even in childhood, often dismissed as growing pains, though now wonders if was something more.   No fevers. no rashes, No red hot swollen joints. No h/o known tick bites.   Cristel gets 7-8 hrs sleep per night. Eating cleanly overall. Does have some added sugars/WF carbs/junk food though tries to limit.. Gets 40+ oz water per day she thinks.   Currently exercise regimen is mainly walking as strength training is on hold . She tries to stretch daily.   Has FH fibromyalgia (pat aunt, and MGM) . No other FH autoimmune issue incl in M/F.  She has not had rheum eval or testing ever.   Tylenol helps pain . Feels like she helped someone move their home the day before or like she was walking on a treadmill all night in her sleep based on how sore she feels in pain. Overall she can manage with sxs but wanted to come in for eval to get a better answer as to poss cause of sxs and to make a treatment plan to hopefully keep sxs from worsening as she gets older  UTD on gyn exams as had one in 8/2024 with Dr. Gu and all wnl. Told her gyn she was skipping placebo pills as I had asked her to do so as I thought they contained iron but she states Dr UNGER said she only has 2 placebo pills per pack (not clear whether they contain iron or not--she will bring pill pack to next visit).

## 2024-09-04 NOTE — ASSESSMENT
[FreeTextEntry1] : CRISTEL PHILLIPS is a 23 year old female here for follow up on medical issues as noted above.  Cristel has ADD and anxiety, and mild high cholesterol. She was also noted to have mild vit D insufficiency (28), low normal/suboptimal vit B12 level, and elevated iron levels on her PE labs 1/2024. She has added b12 and vit D supplements and has cut out placebo pills in her OCP packs and we will repeat labs today.    Joint/muscle aches/pain with assoc signif fatigue-- ?fibromyalgia (there is FH same), ?EDS, ?Lyme, ?other rheum issue. We disc DDx to some extent and revd can start with labs and if wnl monitor for now vs  check labs and also refer her to rheum now. Revd that seeing rheumatologist for addtl eval/testing is reasonable given long duration of her sxs and FH fibromyalgia. She would like to try to get to the bottom of what is causing her sxs and would like to see rheum and start with labs today.   Check labs. TSH was wnl 1/2024 and sxs have been going on for years so can defer on that  Rheum eval. I gave [facility] Mcarthur's contact info; if poss she can sched with Dr. YAZ Arroyo who has expertise in fibromyalgia  Briefly discussed option to try muscle relaxant to see if helps her sxs and she defers for now as would like to get rheum input first and sxs are not at this time severe enough that she feels she needs more than Tylenol

## 2024-09-04 NOTE — HEALTH RISK ASSESSMENT
[0] : 2) Feeling down, depressed, or hopeless: Not at all (0) [PHQ-2 Negative - No further assessment needed] : PHQ-2 Negative - No further assessment needed [Never] : Never [[id number]] : 0

## 2024-09-04 NOTE — REVIEW OF SYSTEMS
[Fatigue] : fatigue [Anxiety] : anxiety [Negative] : Heme/Lymph [Joint Pain] : joint pain [Joint Stiffness] : joint stiffness [Muscle Pain] : muscle pain [Back Pain] : back pain [Fever] : no fever [Chills] : no chills [Recent Change In Weight] : ~T no recent weight change [Joint Swelling] : no joint swelling [Muscle Weakness] : no muscle weakness [Headache] : no headache [Dizziness] : no dizziness [Fainting] : no fainting [Suicidal] : not suicidal [Insomnia] : no insomnia [Depression] : no depression [FreeTextEntry2] : +fatigued all the time [FreeTextEntry9] : diffuse (though lurdes of thighs, knees, wrists, upper back/shoulders) achy sore muscles and joints [de-identified] : +ADD, sxs well controlled on current med regimen [de-identified] : anxiety sxs well controlled with good self care; Feels like sleep is not restful

## 2024-09-04 NOTE — PLAN
[FreeTextEntry1] : Continue all medications as prescribed. Check labs as above. Will adjust any medications based upon lab results. We revd 1/2024 PE labs today.   LDL goal <=100 and HDL goal >=51 ideally. No indication for meds at this stage of her life. She should focus on clean eating and regular exercise +/- can consider psyllium fiber supplement to try to improve lipids.  Discussed clean eating (eg Mediterranean style plant based eating plan) and regular exercise/staying as physically active as possible. Include balance exercises and strength training and core strengthening exercises for bone health and to decrease risk for falls.  Pending rheum eval she can try supp care/conservative measures incl heat, stretching, topical salves, ROM exercises, good ergonomics with screens and good posture, gentle yoga, massage, can consider chiropractic intervention if desired/helpful, can consider acupuncture/cupping etc.   Reviewed importance of good self care (e.g. meditation, yoga, adequate rest, regular exercise, magnesium, clean eating, etc.).  Continue follow up with specialists as recommended by them. Cont ADD meds and f/u as per psychiatrist.  Last CPE was in 1/2024. Next physical due in 1-4 years.  Time spent immediately before and after, as well as Face-to-face time spent during visit with patient, over half in discussion of the above diagnoses and treatment plan: 40 minutes.

## 2024-09-05 ENCOUNTER — LABORATORY RESULT (OUTPATIENT)
Age: 24
End: 2024-09-05

## 2024-09-05 LAB
25(OH)D3 SERPL-MCNC: 32.6 NG/ML
ALBUMIN SERPL ELPH-MCNC: 4.8 G/DL
ALP BLD-CCNC: 70 U/L
ALT SERPL-CCNC: 12 U/L
ANION GAP SERPL CALC-SCNC: 13 MMOL/L
AST SERPL-CCNC: 13 U/L
BASOPHILS # BLD AUTO: 0.03 K/UL
BASOPHILS NFR BLD AUTO: 0.5 %
BILIRUB SERPL-MCNC: 0.5 MG/DL
BUN SERPL-MCNC: 7 MG/DL
CALCIUM SERPL-MCNC: 9.9 MG/DL
CHLORIDE SERPL-SCNC: 101 MMOL/L
CO2 SERPL-SCNC: 22 MMOL/L
CREAT SERPL-MCNC: 0.63 MG/DL
CRP SERPL-MCNC: <3 MG/L
EGFR: 126 ML/MIN/1.73M2
EOSINOPHIL # BLD AUTO: 0.01 K/UL
EOSINOPHIL NFR BLD AUTO: 0.2 %
ERYTHROCYTE [SEDIMENTATION RATE] IN BLOOD BY WESTERGREN METHOD: 3 MM/HR
GLUCOSE SERPL-MCNC: 82 MG/DL
HCT VFR BLD CALC: 45.1 %
HGB BLD-MCNC: 15 G/DL
IMM GRANULOCYTES NFR BLD AUTO: 0.2 %
IRON SATN MFR SERPL: 26 %
IRON SERPL-MCNC: 99 UG/DL
LYMPHOCYTES # BLD AUTO: 2.2 K/UL
LYMPHOCYTES NFR BLD AUTO: 37.6 %
MAN DIFF?: NORMAL
MCHC RBC-ENTMCNC: 30.6 PG
MCHC RBC-ENTMCNC: 33.3 GM/DL
MCV RBC AUTO: 92 FL
MONOCYTES # BLD AUTO: 0.41 K/UL
MONOCYTES NFR BLD AUTO: 7 %
NEUTROPHILS # BLD AUTO: 3.19 K/UL
NEUTROPHILS NFR BLD AUTO: 54.5 %
PLATELET # BLD AUTO: 262 K/UL
POTASSIUM SERPL-SCNC: 4.6 MMOL/L
PROT SERPL-MCNC: 7.4 G/DL
RBC # BLD: 4.9 M/UL
RBC # FLD: 12.6 %
RHEUMATOID FACT SER QL: <10 IU/ML
SODIUM SERPL-SCNC: 137 MMOL/L
TIBC SERPL-MCNC: 386 UG/DL
UIBC SERPL-MCNC: 287 UG/DL
VIT B12 SERPL-MCNC: 328 PG/ML
WBC # FLD AUTO: 5.85 K/UL

## 2024-09-06 LAB — ANA SER IF-ACNC: NEGATIVE

## 2024-10-08 ENCOUNTER — NON-APPOINTMENT (OUTPATIENT)
Age: 24
End: 2024-10-08

## 2024-10-09 ENCOUNTER — TRANSCRIPTION ENCOUNTER (OUTPATIENT)
Age: 24
End: 2024-10-09

## 2024-10-09 ENCOUNTER — LABORATORY RESULT (OUTPATIENT)
Age: 24
End: 2024-10-09

## 2024-10-09 ENCOUNTER — APPOINTMENT (OUTPATIENT)
Dept: RHEUMATOLOGY | Facility: CLINIC | Age: 24
End: 2024-10-09
Payer: COMMERCIAL

## 2024-10-09 VITALS
TEMPERATURE: 97.6 F | WEIGHT: 110 LBS | OXYGEN SATURATION: 98 % | HEART RATE: 88 BPM | SYSTOLIC BLOOD PRESSURE: 108 MMHG | BODY MASS INDEX: 18.78 KG/M2 | DIASTOLIC BLOOD PRESSURE: 68 MMHG | HEIGHT: 64 IN

## 2024-10-09 DIAGNOSIS — R53.82 CHRONIC FATIGUE, UNSPECIFIED: ICD-10-CM

## 2024-10-09 DIAGNOSIS — M79.10 MYALGIA, UNSPECIFIED SITE: ICD-10-CM

## 2024-10-09 DIAGNOSIS — M25.50 PAIN IN UNSPECIFIED JOINT: ICD-10-CM

## 2024-10-09 PROCEDURE — G2211 COMPLEX E/M VISIT ADD ON: CPT | Mod: NC

## 2024-10-09 PROCEDURE — 99205 OFFICE O/P NEW HI 60 MIN: CPT

## 2024-10-14 ENCOUNTER — NON-APPOINTMENT (OUTPATIENT)
Age: 24
End: 2024-10-14

## 2024-10-14 LAB
ALBUMIN SERPL ELPH-MCNC: 4.8 G/DL
ALP BLD-CCNC: 74 U/L
ALT SERPL-CCNC: 10 U/L
ANCA AB SER-IMP: NEGATIVE
ANION GAP SERPL CALC-SCNC: 15 MMOL/L
APPEARANCE: ABNORMAL
AST SERPL-CCNC: 13 U/L
BILIRUB SERPL-MCNC: 0.3 MG/DL
BILIRUBIN URINE: NEGATIVE
BLOOD URINE: NEGATIVE
BUN SERPL-MCNC: 9 MG/DL
C-ANCA SER-ACNC: NEGATIVE
C3 SERPL-MCNC: 134 MG/DL
C4 SERPL-MCNC: 27 MG/DL
CALCIUM SERPL-MCNC: 10 MG/DL
CCP AB SER IA-ACNC: <8 U/ML
CENTROMERE IGG SER-ACNC: <0.2 AL
CHLORIDE SERPL-SCNC: 102 MMOL/L
CK SERPL-CCNC: 47 U/L
CO2 SERPL-SCNC: 22 MMOL/L
COLOR: YELLOW
CREAT SERPL-MCNC: 0.6 MG/DL
CREAT SPEC-SCNC: 69 MG/DL
CREAT/PROT UR: 0.1 RATIO
DSDNA AB SER-ACNC: <1 IU/ML
EGFR: 128 ML/MIN/1.73M2
ENA RNP AB SER IA-ACNC: <0.2 AL
ENA SCL70 IGG SER IA-ACNC: <0.2 AL
ENA SM AB SER IA-ACNC: <0.2 AL
ENA SS-A AB SER IA-ACNC: <0.2 AL
ENA SS-B AB SER IA-ACNC: <0.2 AL
ERYTHROCYTE [SEDIMENTATION RATE] IN BLOOD BY WESTERGREN METHOD: 2 MM/HR
G6PD SER-CCNC: 16.4 U/G HGB
GLUCOSE QUALITATIVE U: NEGATIVE MG/DL
GLUCOSE SERPL-MCNC: 92 MG/DL
KETONES URINE: NEGATIVE MG/DL
LEUKOCYTE ESTERASE URINE: ABNORMAL
NITRITE URINE: NEGATIVE
P-ANCA TITR SER IF: NEGATIVE
PH URINE: 6.5
POTASSIUM SERPL-SCNC: 4.6 MMOL/L
PROT SERPL-MCNC: 7.2 G/DL
PROT UR-MCNC: 5 MG/DL
PROTEIN URINE: NEGATIVE MG/DL
RF+CCP IGG SER-IMP: NEGATIVE
SODIUM SERPL-SCNC: 139 MMOL/L
SPECIFIC GRAVITY URINE: 1.01
UROBILINOGEN URINE: 0.2 MG/DL

## 2024-11-05 ENCOUNTER — TRANSCRIPTION ENCOUNTER (OUTPATIENT)
Age: 24
End: 2024-11-05

## 2024-11-08 ENCOUNTER — TRANSCRIPTION ENCOUNTER (OUTPATIENT)
Age: 24
End: 2024-11-08

## 2025-02-03 ENCOUNTER — APPOINTMENT (OUTPATIENT)
Dept: RHEUMATOLOGY | Facility: CLINIC | Age: 25
End: 2025-02-03